# Patient Record
(demographics unavailable — no encounter records)

---

## 2025-01-07 NOTE — HISTORY OF PRESENT ILLNESS
[de-identified] : This is a 29 year old female who is here for a hospital follow up, recently diagnosed with pulmonary emboli.   She has a history of asthma, depression, PMDD who was recently found to have pulmonary emboli.  She was admitted to [facility] from 8/13-8/17/24. She presented with dyspnea, pleuritic chest pain.  Prior to admission she was placed on Jessica birth control for her PMDD.   CTA 8/13- Bilateral segmental and subsegmental pulmonary emboli, and associated left lower lobe small infarct. No evidence of right heart strain. This   LE duplex 8/13- Suboptimal visualization of the femoral veins No evidence of deep venous thrombosis in either lower extremity.  She denies any history of prior thrombosis, no family history of thrombosis.  No recent travel or long car rides.  No trauma.   She denies any prior pregnancies, no miscarriages.   Her mother has thyroid issues, father/brother are alive and well.    [de-identified] : She is here with her partner.  No new events, complaints.  Overall feeling much improved.  She is concerned about recurrent PE if she starts zepbound, also with travel planned.

## 2025-01-07 NOTE — REVIEW OF SYSTEMS
[Diarrhea: Grade 0] : Diarrhea: Grade 0 [Suicidal] : not suicidal [Depression] : depression [Negative] : Heme/Lymph

## 2025-01-07 NOTE — ASSESSMENT
[FreeTextEntry1] : This is a 29 year old female with a history of asthma, depression/PMDD with bilateral pulmonary emboli, provoked due to OCP use.    CTA 8/13- Bilateral segmental and subsegmental pulmonary emboli, and associated left lower lobe small infarct. No evidence of right heart strain. This   LE duplex 8/13- Suboptimal visualization of the femoral veins No evidence of deep venous thrombosis in either lower extremity.  Antiphosphoplipid panel sent while hospitalized, negative.  Hypercoagulable work up including prothrombin/factor V leiden genes, protein C/S, ATIII levels was negative.  Plan on anticoagulation for 6 months.  Would not recommend any further estrogen use.   She would like continue anticoagulation for now and not stop until she decides about her weight loss medications and travel planned in the near future. Continue eliquis 5mg bid.   No procedures, would need to hold 48 hours prior.   She will follow up in 3 months.

## 2025-01-08 NOTE — ASSESSMENT
[FreeTextEntry1] :   We reviewed the efforts of weight reduction identifying 3500 calories in pound of body fat and the need to gradually achieve a long term basis for weight reduction  discussed the need to reduce calories for what her current patterns are and to hopefully increase physical activity as well. We discussed menu selection as well as food preparation techniques.  Educated patient on 150 minutes of moderate intensity exercise weekly with strength training twice weekly. Encouraged patient to monitor physical activity    Discussed the importance of a balanced, healthy diet of nourishing foods and consistent meal pattern for long-term success and health maintenance. Encouraged to increase water intake to facilitate adequate hydration and to cut out sugary drinks and alcohol.  Pt educated on eating 4-6 small meals per day, that are high in protein for hunger regulation.  Pt educated on cutting out high-sugar content foods sabotaging wt loss Pt verbalized understanding   I encouraged the patient to keep food records in order to better track calorie consumed. I recommended low fat selections and especially those that are lower in saturated fats. Emphasis would be placed on monitoring portions of meat and having more moderate snacks between meal as well.  Plan:  To continue with nutrition counseling Discussed pro vs cons of medical management vs surgical management. Pt interested in medical at this time. Discussed medication options: Patient counseled on diet and exercise 150mins of moderate intensity exercise/weekly Discussed conservative management for weight loss Discussed nutrition and dietician to help with improvement of diet. Counseled on AE of medications Discussed oral medications vs injectable medications discussed long term AE of weight loss meds and long term effects of obesity. Pt declined any hx of MEN syndrome or thyroid medullary carcinoma or pancreatitis.  due to FDA shortage of injectables, discussed oral meds including phentermine/topamax (Qysmia) and contrave  Discussed metformin off label use for weight loss was also discussed     sent PA for zepbound if denied would consider metformin to start orally f/u in1 month

## 2025-01-08 NOTE — HISTORY OF PRESENT ILLNESS
[Home] : at home, [unfilled] , at the time of the visit. [Medical Office: ([facility])___] : at the medical office located in  [Verbal consent obtained from patient] : the patient, [unfilled] [FreeTextEntry1] : PT presenting for evaluation of weight management  Has been flutuating with her weight for several years, fam hx of obesity has endo appt in 2 months for hormonal issues WAs on OCP= had PE 2/2 OCP- currently on eliquis 5 mg spoke with her heme who stated if she starts GLP 1 she would keep on eliquis at a lower dose   Not interested in surgery at this time  no hx of dm recent labs showed elevated lipid panel but otherwise no other medical issues

## 2025-02-05 NOTE — ASSESSMENT
[FreeTextEntry1] :    Emphasized the need to reduce calories for what her current patterns are and to hopefully increase physical activity as well. We discussed menu selection as well as food preparation techniques.  Educated patient on 150 minutes of moderate intensity exercise weekly with strength training twice weekly. Encouraged patient to monitor physical activity  Discussed the importance of a balanced, healthy diet of nourishing foods and consistent meal pattern for long-term success and health maintenance. Encouraged to increase water intake to facilitate adequate hydration and to cut out sugary drinks and alcohol. I encouraged the patient to keep food records in order to better track calorie consumed.   Plan:  To continue with nutrition counseling Medication: Zepbound not covered discussed vials  Will stay at Zepbound 2.5 mg vials at this time due to cost f/u in 4-6 weeks

## 2025-02-05 NOTE — HISTORY OF PRESENT ILLNESS
[Home] : at home, [unfilled] , at the time of the visit. [Medical Office: ([facility])___] : at the medical office located in  [Verbal consent obtained from patient] : the patient, [unfilled] [FreeTextEntry1] : Pt presenting for f/u of medical weight management.   Medication:  Pt currently taking zepbound 2.5 mg vials Has not significant AE with meds    Has not reached goal weight at this time,        Diet: trying to decrease portions and snacks throughout the day has been trying to increase protein intake    Exercise: working on trying to do Cardio+ Weights (3-5 days/week) 30 minutes per session. Currently is moderately physical active.   Sleep: 6-7 hrs/night. Reports not waking up in the middle of the night or waking up tired in AM   Stress: Job   Medications and tolerance: Pt reports feeling as though the medications are helping with her weight.

## 2025-03-05 NOTE — HISTORY OF PRESENT ILLNESS
[Home] : at home, [unfilled] , at the time of the visit. [Medical Office: ([facility])___] : at the medical office located in  [Verbal consent obtained from patient] : the patient, [unfilled] [FreeTextEntry1] : Pt presenting for f/u of medical weight management.   Medication:  Pt currently taking zepbound 2.5 mg vials Has not significant AE with meds    Has not reached goal weight at this time,        Diet: trying to decrease portions and snacks throughout the day has been trying to increase protein intake    Exercise: working on trying to do Cardio+ Weights (3-5 days/week) 30 minutes per session. Currently is moderately physical active.   Sleep: 6-7 hrs/night. Reports not waking up in the middle of the night or waking up tired in AM   Stress: Job   Medications and tolerance: Pt reports feeling as though the medications are helping with her weight.

## 2025-03-05 NOTE — ASSESSMENT
[FreeTextEntry1] :    Emphasized the need to reduce calories for what her current patterns are and to hopefully increase physical activity as well. We discussed menu selection as well as food preparation techniques.  Educated patient on 150 minutes of moderate intensity exercise weekly with strength training twice weekly. Encouraged patient to monitor physical activity  Discussed the importance of a balanced, healthy diet of nourishing foods and consistent meal pattern for long-term success and health maintenance. Encouraged to increase water intake to facilitate adequate hydration and to cut out sugary drinks and alcohol. I encouraged the patient to keep food records in order to better track calorie consumed.   Plan:  To continue with nutrition counseling Medication: Zepbound not covered discussed vials  Will increase  Zepbound 5 mg vials at this time due to cost f/u in 4-6 weeks

## 2025-03-18 NOTE — HISTORY OF PRESENT ILLNESS
[FreeTextEntry1] : 29 year old female presents as a new patient here for surgical consultation regarding endometrial polyp.  referred by Dr. Samm post of [facility], currently on Eliquis  she has an appt with hematology for follow up. her hypercoag workup was negative.  she is currently on Zepbound.

## 2025-03-18 NOTE — HISTORY OF PRESENT ILLNESS
[FreeTextEntry1] : 29 year old female presents as a new patient here for surgical consultation regarding endometrial polyp.  referred by Dr. Samm post of [facility], currently on Eliquis  she has an appt with hematology for follow up. her hypercoag workup was negative.  she is currently on Zepbound.

## 2025-03-18 NOTE — PLAN
[FreeTextEntry1] : see attached image  advised I want pt off Eliquis prior to D&C  will plan d&c at the end of the month.  plan: D&C Polypectomy s/s endometrial polyp    I Darshana Riggins FNP-C am scribing for the presence of Dr. Castro the following sections HISTORY OF PRESENT ILLNESS, PAST MEDICAL/FAMILY/SOCIAL HISTORY; REVIEW OF SYSTEMS; VITAL SIGNS; PHYSICAL EXAM; DISPOSITION.    I personally performed the services described in the documentation, reviewed the documentation recorded by the scribe in my presence and it accurately and completely records my words and actions.

## 2025-04-03 NOTE — REVIEW OF SYSTEMS
[Diarrhea: Grade 0] : Diarrhea: Grade 0 [Depression] : depression [Recent Change In Weight] : ~T recent weight change [Anxiety] : anxiety [Negative] : Allergic/Immunologic [Fever] : no fever [Chills] : no chills [Night Sweats] : no night sweats [Fatigue] : no fatigue [Abdominal Pain] : no abdominal pain [Vomiting] : no vomiting [Constipation] : no constipation [Suicidal] : not suicidal [Insomnia] : no insomnia [FreeTextEntry2] : intentional weight loss on zepbound (down 20 lbs from January to april) [FreeTextEntry7] : +mild nausea with zepbound but not severe [de-identified] : anxiety regarding having another blood clot on zepbound

## 2025-04-03 NOTE — ASSESSMENT
[FreeTextEntry1] : The patient is a 29-year-old female with a history of asthma, depression/PMDD, and bilateral pulmonary emboli provoked due to OCP use following with hematology for AC management.  CTA 8/13- Bilateral segmental and subsegmental pulmonary emboli and associated left lower lobe small infarct. No evidence of right heart strain. This   LE duplex 8/13- Suboptimal visualization of the femoral veins No evidence of deep venous thrombosis in either lower extremity.  Antiphospholipid panel sent while hospitalized, negative.  Hypercoagulable work up including prothrombin/factor V leiden genes, protein C/S, ATIII levels was negative.  Would not recommend any further estrogen use.   Planned on anticoagulation for 6 months, however due to patient starting weight loss medication, she preferred to stay on Eliquis. She was on Eliquis 5 mg PO BID from August - April 2025. Patient still wants to remain on Eliquis due to anxiety surrounding another clot. Discussed with patient, switched her to ppx dosing with 2.5 mg PO BID.  She would like continue anticoagulation for now while she in on tirzepatide. Although there is no clinical evidence establishing a definitive link between GLP-1s and thrombosis, she reports isolated reports/case studies have increased her anxiety around stopping Eliquis completelty.   An example of such case reports include the following article: Radha NOLEN, Pedro Luis MA, Pepe M, Aamir HM, Jaya DAVIS. Extensive Deep Vein Thrombosis in a Young Man Taking Tirzepatide for Weight Loss. AACE Clin Case Rep. 2024 Sep 5;10(6):261-263. doi: [email or web address]/[email or web address]. PMID: [id number]; PMCID: [id number].  Explained that lifelong AC is not indicated for the patient, and that anticoagulants have risks including hemorrhage. The patient expressed understanding and reports she does not want to be on Eliquis lifelong even if she is on Zepbound lifelong. We will address stopping the medication completely at her next appointment.   The patient has a uterine polyp removal procedure planned for 5/6/2025. She will hold her Eliquis for 48 hours prior to the procedure. Given her history of provoked clots, patient will take Eliquis 5 mg BID for one week following the procedure and will then return to 2.5 mg BID until our next appointment.   CBC today WBC 6.0, HGB 13.2, .  She will follow up in 3 months.

## 2025-04-03 NOTE — HISTORY OF PRESENT ILLNESS
[de-identified] : This is a 29 year old female who is here for a hospital follow up, recently diagnosed with pulmonary emboli.   She has a history of asthma, depression, PMDD who was recently found to have pulmonary emboli.  She was admitted to [facility] from 8/13-8/17/24. She presented with dyspnea, pleuritic chest pain.  Prior to admission she was placed on Jessica birth control for her PMDD.   CTA 8/13- Bilateral segmental and subsegmental pulmonary emboli, and associated left lower lobe small infarct. No evidence of right heart strain. This   LE duplex 8/13- Suboptimal visualization of the femoral veins No evidence of deep venous thrombosis in either lower extremity.  She denies any history of prior thrombosis, no family history of thrombosis.  No recent travel or long car rides.  No trauma.   She denies any prior pregnancies, no miscarriages.   Her mother has thyroid issues, father/brother are alive and well.    [de-identified] : No new events, complaints.  She is concerned about recurrent PE while on Zepbound ramp up dosing. Intentional weight loss of 20 lbs since starting Zepbound in January. Endorses some  Denies fevers, chills, LAD, night sweats. Denies dyspnea, chest pain, calf pains, bleeding, dark stools.

## 2025-04-25 NOTE — ASSESSMENT
[Patient Optimized for Surgery] : Patient optimized for surgery [No Further Testing Recommended] : no further testing recommended [FreeTextEntry4] : Advised to RTO for CPE

## 2025-04-25 NOTE — HISTORY OF PRESENT ILLNESS
[No Pertinent Cardiac History] : no history of aortic stenosis, atrial fibrillation, coronary artery disease, recent myocardial infarction, or implantable device/pacemaker [No Pertinent Pulmonary History] : no history of asthma, COPD, sleep apnea, or smoking [No Adverse Anesthesia Reaction] : no adverse anesthesia reaction in self or family member [(Patient denies any chest pain, claudication, dyspnea on exertion, orthopnea, palpitations or syncope)] : Patient denies any chest pain, claudication, dyspnea on exertion, orthopnea, palpitations or syncope [Good (7-10 METs)] : Good (7-10 METs) [Chronic Anticoagulation] : no chronic anticoagulation [Chronic Kidney Disease] : no chronic kidney disease [Diabetes] : no diabetes [FreeTextEntry1] : D/C Hysteroscopy and Polypectomy  [FreeTextEntry2] : 05/06/25 @ [facility]  [FreeTextEntry3] : Dr. Castro  [FreeTextEntry4] : 30 yo F PMH depression, asthma, obesity, PE due to OCP use; new patient presents for medical clearance. PST completed 4/22

## 2025-05-12 NOTE — PLAN
[FreeTextEntry1] :  PLAN: D&C polypectomy  Discussed pre op and post op instructions in detail. Vaginal restrictions post op only all of patients questions regarding procedure were answered. consent signed by MD, patient and witness. she is to f/u with me 2 weeks post operatively. she is agreeable with plan.  I Sukhdev CASTRO am scribing for the presence of Dr. Castro the following sections HISTORY OF PRESENT ILLNESS, PAST MEDICAL/FAMILY/SOCIAL HISTORY; REVIEW OF SYSTEMS; VITAL SIGNS; PHYSICAL EXAM; DISPOSITION. I personally performed the services described in the documentation, reviewed the documentation recorded by the scribe in my presence and it accurately and completely records my words and actions.

## 2025-05-12 NOTE — HISTORY OF PRESENT ILLNESS
[FreeTextEntry1] : 29 year old female hx of 3PEs, currently on Eliquis presents here for final decision for surgery. she is scheduled for D&C Polypectomy s/s endometrial polyp

## 2025-05-12 NOTE — PHYSICAL EXAM
[Appropriately responsive] : appropriately responsive [Alert] : alert [No Acute Distress] : no acute distress [No Lymphadenopathy] : no lymphadenopathy [Regular Rate Rhythm] : regular rate rhythm [No Murmurs] : no murmurs [Clear to Auscultation B/L] : clear to auscultation bilaterally [Soft] : soft [Non-tender] : non-tender [Non-distended] : non-distended [No HSM] : No HSM [No Lesions] : no lesions [No Mass] : no mass [Oriented x3] : oriented x3 [FreeTextEntry2] : Sukhdev Alex

## 2025-05-31 NOTE — HISTORY OF PRESENT ILLNESS
[de-identified] : 29 year old female hx of 3PEs on Eliquis  presents today for post op examination she is s/p D&C Polypectomy s/s endometrial polyp

## 2025-05-31 NOTE — PLAN
[No [place]] : to avoid sexual intercourse [No Tampons/Suppositories] : against using tampons or vaginal suppositories [FreeTextEntry1] :  Recovering from severe total body rash post operatively. path and surgical photos reviewed patient to call me if she has any increased pain over the next few weeks Will send dictated note to Dr. Anil Quijano with op note and surgical picture. follow up for annual pap 3/2026 all of her questions were answered she is agreeable with plan   I Sukhdev CASTRO am scribing for the presence of Dr. Myles Castro the following sections HISTORY OF PRESENT ILLNESS, PAST MEDICAL/FAMILY/SOCIAL HISTORY; REVIEW OF SYSTEMS; VITAL SIGNS; PHYSICAL EXAM; DISPOSITION. I personally performed the services described in the documentation, reviewed the documentation recorded by the scribe in my presence and it accurately and completely records my words and actions.

## 2025-05-31 NOTE — HISTORY OF PRESENT ILLNESS
[de-identified] : 29 year old female hx of 3PEs on Eliquis  presents today for post op examination she is s/p D&C Polypectomy s/s endometrial polyp

## 2025-05-31 NOTE — ASSESSMENT
[Doing Well] : is doing well [No Sign of Infection] : is showing no signs of infection [de-identified] : cervix is closed

## 2025-05-31 NOTE — PLAN
[No [place]] : to avoid sexual intercourse [No Tampons/Suppositories] : against using tampons or vaginal suppositories [FreeTextEntry1] :  Recovering from severe total body rash post operatively. path and surgical photos reviewed patient to call me if she has any increased pain over the next few weeks Will send dictated note to Dr. Anil Quijano with op note and surgical picture. follow up for annual pap 3/2026 all of her questions were answered she is agreeable with plan   I Sukhdev CASTRO am scribing for the presence of Dr. Myles Castro the following sections HISTORY OF PRESENT ILLNESS, PAST MEDICAL/FAMILY/SOCIAL HISTORY; REVIEW OF SYSTEMS; VITAL SIGNS; PHYSICAL EXAM; DISPOSITION. I personally performed the services described in the documentation, reviewed the documentation recorded by the scribe in my presence and it accurately and completely records my words and actions.

## 2025-05-31 NOTE — ASSESSMENT
[Doing Well] : is doing well [No Sign of Infection] : is showing no signs of infection [de-identified] : cervix is closed

## 2025-07-16 NOTE — HISTORY OF PRESENT ILLNESS
[Home] : at home, [unfilled] , at the time of the visit. [Medical Office: ([facility])___] : at the medical office located in  [Verbal consent obtained from patient] : the patient, [unfilled] [FreeTextEntry1] : Pt presenting for f/u of medical weight management.   Medication:  Pt currently taking zepbound 5 mg vials Has not significant AE with meds    Has not reached goal weight at this time,        Diet: trying to decrease portions and snacks throughout the day has been trying to increase protein intake    Exercise: working on trying to do Cardio+ Weights (3-5 days/week) 30 minutes per session. Currently is moderately physical active.   Sleep: 6-7 hrs/night. Reports not waking up in the middle of the night or waking up tired in AM   Stress: Job   Medications and tolerance: Pt reports feeling as though the medications are helping with her weight.

## 2025-07-16 NOTE — HISTORY OF PRESENT ILLNESS
[Home] : at home, [unfilled] , at the time of the visit. [Medical Office: ([facility])___] : at the medical office located in  [Verbal consent obtained from patient] : the patient, [unfilled] [FreeTextEntry1] : Pt presenting for f/u of medical weight management.   Medication:  Pt currently taking zepbound 5 mg vials Has not significant AE with meds    Has not reached goal weight at this time,        Diet: trying to decrease portions and snacks throughout the day has been trying to increase protein intake    Exercise: working on trying to do Cardio+ Weights (3-5 days/week) 30 minutes per session. Currently is moderately physical active.   Sleep: 6-7 hrs/night. Reports not waking up in the middle of the night or waking up tired in AM   Stress: Job   Medications and tolerance: Pt reports feeling as though the medications are helping with her weight.

## 2025-07-16 NOTE — ASSESSMENT
[FreeTextEntry1] :    Emphasized the need to reduce calories for what her current patterns are and to hopefully increase physical activity as well. We discussed menu selection as well as food preparation techniques.  Educated patient on 150 minutes of moderate intensity exercise weekly with strength training twice weekly. Encouraged patient to monitor physical activity  Discussed the importance of a balanced, healthy diet of nourishing foods and consistent meal pattern for long-term success and health maintenance. Encouraged to increase water intake to facilitate adequate hydration and to cut out sugary drinks and alcohol. I encouraged the patient to keep food records in order to better track calorie consumed.   Plan:  To continue with nutrition counseling Medication: Zepbound not covered discussed vials  Will cont Zepbound 5 mg vials at this time due to cost f/u in 4-6 weeks   scheduled for polyp removal next month- advised to stop zepbound 1 week prior to procedure

## 2025-07-25 NOTE — REVIEW OF SYSTEMS
[Fever] : no fever [Chills] : no chills [Night Sweats] : no night sweats [Fatigue] : no fatigue [Recent Change In Weight] : ~T recent weight change [Abdominal Pain] : no abdominal pain [Vomiting] : no vomiting [Constipation] : no constipation [Diarrhea: Grade 0] : Diarrhea: Grade 0 [Suicidal] : not suicidal [Insomnia] : no insomnia [Anxiety] : anxiety [Depression] : depression [Negative] : Allergic/Immunologic [FreeTextEntry2] : intentional weight loss on zepbound (down 20 lbs from January to april) [FreeTextEntry7] : +mild nausea with zepbound but not severe [de-identified] : anxiety regarding having another blood clot on zepbound

## 2025-07-25 NOTE — REVIEW OF SYSTEMS
[Fever] : no fever [Chills] : no chills [Night Sweats] : no night sweats [Fatigue] : no fatigue [Recent Change In Weight] : ~T recent weight change [Abdominal Pain] : no abdominal pain [Vomiting] : no vomiting [Constipation] : no constipation [Diarrhea: Grade 0] : Diarrhea: Grade 0 [Suicidal] : not suicidal [Insomnia] : no insomnia [Anxiety] : anxiety [Depression] : depression [Negative] : Allergic/Immunologic [FreeTextEntry2] : intentional weight loss on zepbound (down 20 lbs from January to april) [FreeTextEntry7] : +mild nausea with zepbound but not severe [de-identified] : anxiety regarding having another blood clot on zepbound

## 2025-07-25 NOTE — HISTORY OF PRESENT ILLNESS
[de-identified] : This is a 29 year old female who is here for a hospital follow up, recently diagnosed with pulmonary emboli.   She has a history of asthma, depression, PMDD who was recently found to have pulmonary emboli.  She was admitted to [facility] from 8/13-8/17/24. She presented with dyspnea, pleuritic chest pain.  Prior to admission she was placed on Jessica birth control for her PMDD.   CTA 8/13- Bilateral segmental and subsegmental pulmonary emboli, and associated left lower lobe small infarct. No evidence of right heart strain. This   LE duplex 8/13- Suboptimal visualization of the femoral veins No evidence of deep venous thrombosis in either lower extremity.  She denies any history of prior thrombosis, no family history of thrombosis.  No recent travel or long car rides.  No trauma.   She denies any prior pregnancies, no miscarriages.   Her mother has thyroid issues, father/brother are alive and well.    [de-identified] : No new events, complaints.  She is concerned about recurrent PE while on Zepbound ramp up dosing. Intentional weight loss of 20 lbs since starting Zepbound in January. Endorses some  Denies fevers, chills, LAD, night sweats. Denies dyspnea, chest pain, calf pains, bleeding, dark stools.

## 2025-07-25 NOTE — ASSESSMENT
[FreeTextEntry1] : The patient is a 29-year-old female with a history of asthma, depression/PMDD, and bilateral pulmonary emboli provoked due to OCP use following with hematology for AC management.  CTA 8/13- Bilateral segmental and subsegmental pulmonary emboli and associated left lower lobe small infarct. No evidence of right heart strain. This   LE duplex 8/13- Suboptimal visualization of the femoral veins No evidence of deep venous thrombosis in either lower extremity.  Antiphospholipid panel sent while hospitalized, negative.  Hypercoagulable work up including prothrombin/factor V leiden genes, protein C/S, ATIII levels was negative.  Would not recommend any further estrogen use.   Planned on anticoagulation for 6 months, however due to patient starting weight loss medication, she preferred to stay on Eliquis. She was on Eliquis 5 mg PO BID from August - April 2025. Patient still wants to remain on Eliquis due to anxiety surrounding another clot. Discussed with patient, switched her to ppx dosing with 2.5 mg PO BID.  She would like continue anticoagulation for now while she in on tirzepatide. Although there is no clinical evidence establishing a definitive link between GLP-1s and thrombosis, she reports isolated reports/case studies have increased her anxiety around stopping Eliquis completelty.   An example of such case reports include the following article: Radha NOLEN, Pedro Luis MA, Pepe M, Aamir HM, Jaya DAVIS. Extensive Deep Vein Thrombosis in a Young Man Taking Tirzepatide for Weight Loss. [facility] Clin Case Rep. 2024 Sep 5;10(6):261-263. doi: [email or web address]/[email or web address]. PMID: 52400698; PMCID: [id number].  Explained that lifelong AC is not indicated for the patient, and that anticoagulants have risks including hemorrhage. The patient expressed understanding and reports she does not want to be on Eliquis lifelong even if she is on Zepbound lifelong. We will address stopping the medication completely at her next appointment.   The patient has a uterine polyp removal procedure planned for 5/6/2025. She will hold her Eliquis for 48 hours prior to the procedure. Given her history of provoked clots, patient will take Eliquis 5 mg BID for one week following the procedure and will then return to 2.5 mg BID until our next appointment.   CBC today WBC 6.0, HGB 13.2, .  She will follow up in 3 months.

## 2025-07-25 NOTE — ASSESSMENT
[FreeTextEntry1] : The patient is a 29-year-old female with a history of asthma, depression/PMDD, and bilateral pulmonary emboli provoked due to OCP use following with hematology for AC management.  CTA 8/13- Bilateral segmental and subsegmental pulmonary emboli and associated left lower lobe small infarct. No evidence of right heart strain. This   LE duplex 8/13- Suboptimal visualization of the femoral veins No evidence of deep venous thrombosis in either lower extremity.  Antiphospholipid panel sent while hospitalized, negative.  Hypercoagulable work up including prothrombin/factor V leiden genes, protein C/S, ATIII levels was negative.  Would not recommend any further estrogen use.   Planned on anticoagulation for 6 months, however due to patient starting weight loss medication, she preferred to stay on Eliquis. She was on Eliquis 5 mg PO BID from August - April 2025. Patient still wants to remain on Eliquis due to anxiety surrounding another clot. Discussed with patient, switched her to ppx dosing with 2.5 mg PO BID.  She would like continue anticoagulation for now while she in on tirzepatide. Although there is no clinical evidence establishing a definitive link between GLP-1s and thrombosis, she reports isolated reports/case studies have increased her anxiety around stopping Eliquis completelty.   An example of such case reports include the following article: Radha NOLEN, Pedro Luis MA, Pepe M, Aamir HM, Jaya DAVIS. Extensive Deep Vein Thrombosis in a Young Man Taking Tirzepatide for Weight Loss. [facility] Clin Case Rep. 2024 Sep 5;10(6):261-263. doi: [email or web address]. PMID: [id number]; PMCID: [id number].  Explained that lifelong AC is not indicated for the patient, and that anticoagulants have risks including hemorrhage. The patient expressed understanding and reports she does not want to be on Eliquis lifelong even if she is on Zepbound lifelong. We will address stopping the medication completely at her next appointment.   The patient has a uterine polyp removal procedure planned for 5/6/2025. She will hold her Eliquis for 48 hours prior to the procedure. Given her history of provoked clots, patient will take Eliquis 5 mg BID for one week following the procedure and will then return to 2.5 mg BID until our next appointment.   CBC today WBC 6.0, HGB 13.2, .  She will follow up in 3 months.

## 2025-07-25 NOTE — HISTORY OF PRESENT ILLNESS
[de-identified] : This is a 29 year old female who is here for a hospital follow up, recently diagnosed with pulmonary emboli.   She has a history of asthma, depression, PMDD who was recently found to have pulmonary emboli.  She was admitted to [facility] from 8/13-8/17/24. She presented with dyspnea, pleuritic chest pain.  Prior to admission she was placed on Jessica birth control for her PMDD.   CTA 8/13- Bilateral segmental and subsegmental pulmonary emboli, and associated left lower lobe small infarct. No evidence of right heart strain. This   LE duplex 8/13- Suboptimal visualization of the femoral veins No evidence of deep venous thrombosis in either lower extremity.  She denies any history of prior thrombosis, no family history of thrombosis.  No recent travel or long car rides.  No trauma.   She denies any prior pregnancies, no miscarriages.   Her mother has thyroid issues, father/brother are alive and well.    [de-identified] : No new events, complaints.  She is concerned about recurrent PE while on Zepbound ramp up dosing. Intentional weight loss of 20 lbs since starting Zepbound in January. Endorses some  Denies fevers, chills, LAD, night sweats. Denies dyspnea, chest pain, calf pains, bleeding, dark stools.